# Patient Record
Sex: MALE | Race: WHITE | Employment: OTHER | URBAN - METROPOLITAN AREA
[De-identification: names, ages, dates, MRNs, and addresses within clinical notes are randomized per-mention and may not be internally consistent; named-entity substitution may affect disease eponyms.]

---

## 2022-09-08 ENCOUNTER — CLINICAL DOCUMENTATION (OUTPATIENT)
Dept: NEUROSURGERY | Age: 67
End: 2022-09-08

## 2022-09-08 NOTE — PROGRESS NOTES
Received hand delivered MRI report from Dr. Emil Rodriguez for Dr. Narendra Osorio review -he reviewed and was wanting to see pt tomorrow (Friday 9/9/2022)  Finally got ahold of pt whos then states hes a  -I asked him to call office back at a better time for him. Will edwin next avail. (Sooner than later) Cervical Spine -[facility] -requested.

## 2022-09-09 ENCOUNTER — OFFICE VISIT (OUTPATIENT)
Dept: NEUROSURGERY | Age: 67
End: 2022-09-09
Payer: COMMERCIAL

## 2022-09-09 VITALS
WEIGHT: 198 LBS | SYSTOLIC BLOOD PRESSURE: 123 MMHG | TEMPERATURE: 97.3 F | OXYGEN SATURATION: 96 % | HEART RATE: 64 BPM | HEIGHT: 73 IN | BODY MASS INDEX: 26.24 KG/M2 | DIASTOLIC BLOOD PRESSURE: 85 MMHG

## 2022-09-09 DIAGNOSIS — G95.9 CERVICAL MYELOPATHY (HCC): ICD-10-CM

## 2022-09-09 DIAGNOSIS — M48.02 CERVICAL SPINAL STENOSIS: ICD-10-CM

## 2022-09-09 DIAGNOSIS — M48.02 NEUROFORAMINAL STENOSIS OF CERVICAL SPINE: ICD-10-CM

## 2022-09-09 DIAGNOSIS — M54.12 CERVICAL RADICULOPATHY: Primary | ICD-10-CM

## 2022-09-09 DIAGNOSIS — M50.30 DEGENERATIVE DISC DISEASE, CERVICAL: ICD-10-CM

## 2022-09-09 PROCEDURE — 1123F ACP DISCUSS/DSCN MKR DOCD: CPT | Performed by: NEUROLOGICAL SURGERY

## 2022-09-09 PROCEDURE — 99204 OFFICE O/P NEW MOD 45 MIN: CPT | Performed by: NEUROLOGICAL SURGERY

## 2022-09-09 RX ORDER — CYCLOBENZAPRINE HCL 10 MG
TABLET ORAL
COMMUNITY
Start: 2022-07-12

## 2022-09-09 RX ORDER — FAMOTIDINE 20 MG/1
20 TABLET, FILM COATED ORAL
COMMUNITY

## 2022-09-09 RX ORDER — COLCHICINE 0.6 MG/1
0.6 TABLET ORAL DAILY
COMMUNITY
Start: 2017-04-05

## 2022-09-09 RX ORDER — CHLORAL HYDRATE 500 MG
1 CAPSULE ORAL DAILY
COMMUNITY

## 2022-09-09 RX ORDER — ALLOPURINOL 100 MG/1
TABLET ORAL
COMMUNITY
Start: 2022-08-13

## 2022-09-09 RX ORDER — CELECOXIB 200 MG/1
200 CAPSULE ORAL
COMMUNITY

## 2022-09-09 ASSESSMENT — PATIENT HEALTH QUESTIONNAIRE - PHQ9
SUM OF ALL RESPONSES TO PHQ QUESTIONS 1-9: 0
1. LITTLE INTEREST OR PLEASURE IN DOING THINGS: 0
SUM OF ALL RESPONSES TO PHQ QUESTIONS 1-9: 0
SUM OF ALL RESPONSES TO PHQ9 QUESTIONS 1 & 2: 0
2. FEELING DOWN, DEPRESSED OR HOPELESS: 0

## 2022-09-09 NOTE — PROGRESS NOTES
disc disease with significant disc height collapse and a broad-based disc osteophyte complex that results in severe spinal stenosis with cord compression there is also moderate to severe uncovertebral joint hypertrophy and facet arthropathy resulting in neuroforaminal stenosis. There is no definitive intrinsic cord signal change there is motion degradation which does limit evaluation for potential intrinsic cord signal change.     Past Medical History:   Diagnosis Date    GERD (gastroesophageal reflux disease)     Gout involving toe       Past Surgical History:   Procedure Laterality Date    LEG SURGERY Left     DVT    TONSILLECTOMY AND ADENOIDECTOMY       No Known Allergies   Family History   Problem Relation Age of Onset    No Known Problems Mother     No Known Problems Father       Social History     Socioeconomic History    Marital status:      Spouse name: Not on file    Number of children: Not on file    Years of education: Not on file    Highest education level: Not on file   Occupational History    Not on file   Tobacco Use    Smoking status: Never    Smokeless tobacco: Never   Vaping Use    Vaping Use: Never used   Substance and Sexual Activity    Alcohol use: Never    Drug use: Not on file    Sexual activity: Not on file   Other Topics Concern    Not on file   Social History Narrative    Not on file     Social Determinants of Health     Financial Resource Strain: Not on file   Food Insecurity: Not on file   Transportation Needs: Not on file   Physical Activity: Not on file   Stress: Not on file   Social Connections: Not on file   Intimate Partner Violence: Not on file   Housing Stability: Not on file     Current Outpatient Medications   Medication Sig Dispense Refill    Multiple Vitamins-Minerals (MULTIVITAMIN ADULTS PO) Take 1 tablet by mouth daily      Omega-3 1000 MG CAPS Take 1 capsule by mouth daily      allopurinol (ZYLOPRIM) 100 MG tablet       Calcium Carbonate-Vitamin D (CALCIUM-VITAMIN D) 600-125 MG-UNIT TABS Take by mouth      celecoxib (CELEBREX) 200 MG capsule Take 200 mg by mouth      colchicine (COLCRYS) 0.6 MG tablet Take 0.6 mg by mouth daily      cyclobenzaprine (FLEXERIL) 10 MG tablet       famotidine (PEPCID) 20 MG tablet Take 20 mg by mouth       No current facility-administered medications for this visit. There is no problem list on file for this patient. Review of Systems: A complete ROS was done and as stated in the HPI or otherwise negative. Physical Exam:  /85   Pulse 64   Temp 97.3 °F (36.3 °C) (Temporal)   Ht 6' 1\" (1.854 m)   Wt 198 lb (89.8 kg)   SpO2 96%   BMI 26.12 kg/m²   Pain Score:   3    General: No acute distress  CV: Regular rate   Resp: No increased work of breathing  Mood and affect appropriate  Awake, alert, and oriented to person, place, time, and situation   Eyes open spontaneously   Wears a facemask  Hearing grossly intact  Face symmetric and tongue mid-line on protrusion   No mid-line cervical, thoracic, or lumbar tenderness to palpation   Patient with strength exam as follows:   Upper Extremities: Right Left      Deltoid  5 5    Biceps  5 5    Triceps  5 5      5 4   Hand Intrinsics            5 3    Finger Flexion 5 3   Finger Extension 5 3       Lower Extremities:      Hip Flex 5 5    Quads  5 5    Hamstrings 5 5    Dorsiflex 5 5    Plantarflex 5 5    EHL  5 5  Thenar atrophy present on the left  Sensation decreased to the light touch in a left C6-C7 distribution  DTR 1+ in the upper extremities 3+ patellar responses  No clonus   No Moody's sign present bilaterally   Gait: normal nonantalgic gait, stands from a seated position without difficulty and ambulates independently    Assessment & Plan:  Nella Rizzo is a 77 y.o. right-hand-dominant male who presents today for evaluation of left upper extremity numbness and tingling and left upper extremity hand weakness.   I have independently reviewed and interpreted the patient's MRI cervical spine that demonstrates cervical spondylosis with mild disc bulge at C4-C5 mild disc bulge at C3-C4 that results in no significant spinal stenosis. At C6-C7 however there is a degenerative disc disease with significant disc height collapse and a broad-based disc osteophyte complex that results in severe spinal stenosis with cord compression there is also moderate to severe uncovertebral joint hypertrophy and facet arthropathy resulting in neuroforaminal stenosis. There is no definitive intrinsic cord signal change there is motion degradation which does limit evaluation for potential intrinsic cord signal change. At this time the patient has signs and symptoms of cervical radiculopathy with myelopathy as he has thenar wasting and weakness in his left hand with respect to finger flexion, finger extension and hand intrinsics. He also has decreased sensation in the C6 and C7 distribution. At this point given the imaging findings of a severe spinal stenosis and severe neuroforaminal stenosis I highly recommend proceeding with a C6-C7 anterior cervical discectomy and fusion for treatment of his cervical radiculopathy with superimposed myelopathy. I have discussed with the patient the risk and benefits of surgery. I discussed with him the length of time that he will have limitations imposed on him with respect to physical abilities. I discussed the planned procedure in detail.  Anterior Cervical Fusion Procedures Consent: The relative risks of complication include but are not limited to infection, bleeding, spinal fluid leak, major vascular injury, increased pain, weakness, numbness, non fusion, instrumentation failure, need for re operation, postoperative hematoma that may require surgical evacuation, dysphonia, dysphagia, carotid, jugular, esophogeal and/or tracheal, paralysis, incontinence, impotence, heart attack, stroke and death were discussed with patient he has been provided the opportunity to ask any questions regarding the surgical procedure. He will take time to discuss with his family and place a employment he has needs for surgery and will call back and contact us to schedule a surgery date that fits his schedule. We will work to get him scheduled in an urgent fashion when she agrees to proceed with surgery. ICD-10-CM    1. Cervical radiculopathy  M54.12       2. Degenerative disc disease, cervical  M50.30       3. Cervical spinal stenosis  M48.02       4. Neuroforaminal stenosis of cervical spine  M48.02       5. Cervical myelopathy ([facility])  G95.9         Haris Shaw, [address] [place] [address]     Notes are transcribed with Judi Bullock, a medical voice recording dictation service, and may contain minor errors.